# Patient Record
Sex: FEMALE | Race: WHITE
[De-identification: names, ages, dates, MRNs, and addresses within clinical notes are randomized per-mention and may not be internally consistent; named-entity substitution may affect disease eponyms.]

---

## 2020-02-06 ENCOUNTER — HOSPITAL ENCOUNTER (EMERGENCY)
Dept: HOSPITAL 12 - ER | Age: 35
LOS: 1 days | Discharge: LEFT BEFORE DISCHARGE | End: 2020-02-07
Payer: MEDICAID

## 2020-02-06 VITALS — BODY MASS INDEX: 22.5 KG/M2 | HEIGHT: 66 IN | WEIGHT: 140 LBS

## 2020-02-06 DIAGNOSIS — L02.412: Primary | ICD-10-CM

## 2020-02-06 DIAGNOSIS — L03.112: ICD-10-CM

## 2020-02-06 LAB — HCG UR QL: NEGATIVE

## 2020-02-06 PROCEDURE — A4663 DIALYSIS BLOOD PRESSURE CUFF: HCPCS

## 2020-02-06 NOTE — NUR
DRAINING ABSCESS NOTED TO LEFT AXILLA, 

ERMD AT BEDSIDE FOR HX AND PHYSICAL 



CLEANED SITE AND DRESSED

## 2020-02-11 ENCOUNTER — HOSPITAL ENCOUNTER (EMERGENCY)
Dept: HOSPITAL 54 - ER | Age: 35
Discharge: LEFT BEFORE DISCHARGE | End: 2020-02-11
Payer: MEDICAID

## 2020-02-11 VITALS — WEIGHT: 156 LBS | BODY MASS INDEX: 24.48 KG/M2 | HEIGHT: 67 IN

## 2020-02-11 VITALS — DIASTOLIC BLOOD PRESSURE: 84 MMHG | SYSTOLIC BLOOD PRESSURE: 134 MMHG

## 2020-02-11 DIAGNOSIS — Z53.21: Primary | ICD-10-CM
